# Patient Record
Sex: MALE | Race: AMERICAN INDIAN OR ALASKA NATIVE | NOT HISPANIC OR LATINO
[De-identification: names, ages, dates, MRNs, and addresses within clinical notes are randomized per-mention and may not be internally consistent; named-entity substitution may affect disease eponyms.]

---

## 2024-07-09 ENCOUNTER — DASHBOARD ENCOUNTERS (OUTPATIENT)
Age: 49
End: 2024-07-09

## 2024-07-09 ENCOUNTER — OFFICE VISIT (OUTPATIENT)
Dept: URBAN - METROPOLITAN AREA CLINIC 115 | Facility: CLINIC | Age: 49
End: 2024-07-09
Payer: COMMERCIAL

## 2024-07-09 VITALS
DIASTOLIC BLOOD PRESSURE: 90 MMHG | HEART RATE: 70 BPM | BODY MASS INDEX: 27.33 KG/M2 | SYSTOLIC BLOOD PRESSURE: 132 MMHG | TEMPERATURE: 97.5 F | HEIGHT: 71 IN | WEIGHT: 195.2 LBS

## 2024-07-09 DIAGNOSIS — Z87.19 HISTORY OF DIVERTICULITIS: ICD-10-CM

## 2024-07-09 DIAGNOSIS — F10.10 ETOH ABUSE: ICD-10-CM

## 2024-07-09 DIAGNOSIS — R10.30 LOWER ABDOMINAL PAIN: ICD-10-CM

## 2024-07-09 DIAGNOSIS — Z80.0 FAMILY HISTORY OF LIVER CANCER: ICD-10-CM

## 2024-07-09 PROBLEM — 15167005: Status: ACTIVE | Noted: 2024-07-09

## 2024-07-09 PROBLEM — 429236009: Status: ACTIVE | Noted: 2024-07-09

## 2024-07-09 PROBLEM — 118361000119100: Status: ACTIVE | Noted: 2024-07-09

## 2024-07-09 PROCEDURE — 99204 OFFICE O/P NEW MOD 45 MIN: CPT

## 2024-07-09 RX ORDER — HYDROCHLOROTHIAZIDE 12.5 MG/1
TABLET ORAL
Qty: 90 TABLET | Status: ACTIVE | COMMUNITY

## 2024-07-09 RX ORDER — CIPROFLOXACIN HYDROCHLORIDE 500 MG/1
TABLET, FILM COATED ORAL
Qty: 14 TABLET | Status: DISCONTINUED | COMMUNITY

## 2024-07-09 RX ORDER — PREDNISONE 50 MG/1
TABLET ORAL
Qty: 5 TABLET | Status: ACTIVE | COMMUNITY

## 2024-07-09 RX ORDER — AMLODIPINE BESYLATE 2.5 MG/1
TAKE 1 TABLET (2.5 MG) BY ORAL ROUTE ONCE DAILY TABLET ORAL 1
Qty: 0 | Refills: 0 | Status: DISCONTINUED | COMMUNITY
Start: 1900-01-01

## 2024-07-09 RX ORDER — METRONIDAZOLE 500 MG/1
TABLET ORAL
Qty: 14 TABLET | Status: DISCONTINUED | COMMUNITY

## 2024-07-09 RX ORDER — METOPROLOL SUCCINATE 50 MG/1
TABLET, EXTENDED RELEASE ORAL
Qty: 90 TABLET | Status: ACTIVE | COMMUNITY

## 2024-07-09 RX ORDER — OXYCODONE HYDROCHLORIDE AND ACETAMINOPHEN 10; 325 MG/1; MG/1
TAKE 1 TABLET BY ORAL ROUTE EVERY 6 HOURS AS NEEDED TABLET ORAL
Qty: 0 | Refills: 0 | Status: ACTIVE | COMMUNITY
Start: 1900-01-01

## 2024-07-09 RX ORDER — AMLODIPINE BESYLATE 10 MG/1
TABLET ORAL
Qty: 90 TABLET | Status: ACTIVE | COMMUNITY

## 2024-07-09 NOTE — HPI-TODAY'S VISIT:
49 y/o M with PMH of HTN, DDD on percocet, arthritis presents to [facility]. States he has a history of IBS; starting 1.5yrs ago, he had low abdominal pain and rectal bleeding so he went to the ER and was diagnosed with diverticulitis; but also had years of moving abdominal pain, was told it was probably "trapped gas" prior to the diverticulitis. A couple mos ago he had a lot of abodminal pain and gas, worse with eating salads/hamburgers. Was put on ciprofloxacin and metro but only took the cipro (miscommunication regarding needing both); which had helped a bit. After catching COVID in 2020, he started having issues with eating fruits/veggies. Reports constipation only with travel; usually goes 2-3x a day, soft and formed. States 4-5 days ago he had watery BMs for one day. States that before having a BM, he coughs up sometimes. Denies n/v, diarrhea, constipation, blood in stool, dysphagia, odynophagia, heartburn, unintentional weight loss, change in appetite, early satiety. Denies NSAID use. Takes iron sometimes. Reports EtOH (6 pack of beer a day for 10 years). Reports tobacco/marijuana use.  States he has had a hx of transaminitis and low WBC with PCP. CT from 2022 showed diverticulosis only. Last colonoscopy: 2014 with Dr. Saunders showed ext/int hemorrhoids; repeat at age 44 Had EGD in 2017 which showed mild chronic gastritis

## 2024-07-16 ENCOUNTER — TELEPHONE ENCOUNTER (OUTPATIENT)
Dept: URBAN - METROPOLITAN AREA CLINIC 115 | Facility: CLINIC | Age: 49
End: 2024-07-16

## 2024-07-16 ENCOUNTER — LAB OUTSIDE AN ENCOUNTER (OUTPATIENT)
Dept: URBAN - METROPOLITAN AREA CLINIC 115 | Facility: CLINIC | Age: 49
End: 2024-07-16

## 2024-07-16 LAB
ALBUMIN/GLOBULIN RATIO: 1.3
ALBUMIN: 4.6
ALKALINE PHOSPHATASE: 107
ALT: 60
AST: 51
BILIRUBIN, TOTAL: 1
BUN/CREATININE RATIO: (no result)
CALCIUM: 9.9
CARBON DIOXIDE: 27
CHLORIDE: 99
CREATININE: 0.85
EGFR: 107
ENHANCED LIVER FIBROSIS (ELF) SCORE: 8.91
FIB 4 INDEX: 1.6
FIB 4 INTERPRETATION: (no result)
GLOBULIN: 3.5
GLUCOSE: 94
HEMATOCRIT: 50.1
HEMOGLOBIN: 16.6
MCH: 32.5
MCHC: 33.1
MCV: 98.2
MPV: 9.9
PLATELET COUNT: 202
PLATELET COUNT: 202
POTASSIUM: 3.9
PROTEIN, TOTAL: 8.1
RDW: 11.8
RED BLOOD CELL COUNT: 5.1
SODIUM: 136
UREA NITROGEN (BUN): 9
WHITE BLOOD CELL COUNT: 4.1

## 2024-07-19 ENCOUNTER — OFFICE VISIT (OUTPATIENT)
Dept: URBAN - METROPOLITAN AREA CLINIC 115 | Facility: CLINIC | Age: 49
End: 2024-07-19

## 2024-08-08 ENCOUNTER — OFFICE VISIT (OUTPATIENT)
Dept: URBAN - METROPOLITAN AREA CLINIC 114 | Facility: CLINIC | Age: 49
End: 2024-08-08
Payer: COMMERCIAL

## 2024-08-08 DIAGNOSIS — R93.2 ABNORMAL ULTRASOUND OF LIVER: ICD-10-CM

## 2024-08-08 PROCEDURE — 76705 ECHO EXAM OF ABDOMEN: CPT | Performed by: INTERNAL MEDICINE

## 2024-08-20 ENCOUNTER — OFFICE VISIT (OUTPATIENT)
Dept: URBAN - METROPOLITAN AREA SURGERY CENTER 13 | Facility: SURGERY CENTER | Age: 49
End: 2024-08-20
Payer: COMMERCIAL

## 2024-08-20 DIAGNOSIS — Z12.11 ENCOUNTER FOR SCREENING FOR MALIGNANT NEOPLASM OF COLON: ICD-10-CM

## 2024-08-20 DIAGNOSIS — Z12.11 COLON CANCER SCREENING: ICD-10-CM

## 2024-08-20 DIAGNOSIS — K64.8 OTHER HEMORRHOIDS: ICD-10-CM

## 2024-08-20 DIAGNOSIS — K57.30 DIVERTICULOSIS OF LARGE INTESTINE WITHOUT PERFORATION OR ABSCESS WITHOUT BLEEDING: ICD-10-CM

## 2024-08-20 PROCEDURE — 0528F RCMND FLW-UP 10 YRS DOCD: CPT | Performed by: INTERNAL MEDICINE

## 2024-08-20 PROCEDURE — 00812 ANES LWR INTST SCR COLSC: CPT | Performed by: ANESTHESIOLOGIST ASSISTANT

## 2024-08-20 PROCEDURE — 00812 ANES LWR INTST SCR COLSC: CPT | Performed by: ANESTHESIOLOGY

## 2024-08-20 PROCEDURE — 45378 DIAGNOSTIC COLONOSCOPY: CPT | Performed by: INTERNAL MEDICINE

## 2024-08-20 RX ORDER — OXYCODONE HYDROCHLORIDE AND ACETAMINOPHEN 10; 325 MG/1; MG/1
TAKE 1 TABLET BY ORAL ROUTE EVERY 6 HOURS AS NEEDED TABLET ORAL
Qty: 0 | Refills: 0 | Status: ACTIVE | COMMUNITY
Start: 1900-01-01

## 2024-08-20 RX ORDER — PREDNISONE 50 MG/1
TABLET ORAL
Qty: 5 TABLET | Status: ACTIVE | COMMUNITY

## 2024-08-20 RX ORDER — METOPROLOL SUCCINATE 50 MG/1
TABLET, EXTENDED RELEASE ORAL
Qty: 90 TABLET | Status: ACTIVE | COMMUNITY

## 2024-08-20 RX ORDER — AMLODIPINE BESYLATE 10 MG/1
TABLET ORAL
Qty: 90 TABLET | Status: ACTIVE | COMMUNITY

## 2024-08-20 RX ORDER — HYDROCHLOROTHIAZIDE 12.5 MG/1
TABLET ORAL
Qty: 90 TABLET | Status: ACTIVE | COMMUNITY

## 2025-02-10 ENCOUNTER — OFFICE VISIT (OUTPATIENT)
Dept: URBAN - METROPOLITAN AREA CLINIC 82 | Facility: CLINIC | Age: 50
End: 2025-02-10
Payer: COMMERCIAL

## 2025-02-10 VITALS
HEIGHT: 71 IN | WEIGHT: 187.4 LBS | DIASTOLIC BLOOD PRESSURE: 75 MMHG | HEART RATE: 86 BPM | BODY MASS INDEX: 26.23 KG/M2 | SYSTOLIC BLOOD PRESSURE: 110 MMHG

## 2025-02-10 DIAGNOSIS — Z87.19 HISTORY OF DIVERTICULITIS: ICD-10-CM

## 2025-02-10 DIAGNOSIS — R93.3 ABNORMAL CT SCAN, SIGMOID COLON: ICD-10-CM

## 2025-02-10 DIAGNOSIS — F10.10 ETOH ABUSE: ICD-10-CM

## 2025-02-10 DIAGNOSIS — R10.32 ABDOMINAL CRAMPING IN LEFT LOWER QUADRANT: ICD-10-CM

## 2025-02-10 DIAGNOSIS — R79.89 ABNORMAL LFTS (LIVER FUNCTION TESTS): ICD-10-CM

## 2025-02-10 DIAGNOSIS — K29.50 MILD CHRONIC GASTRITIS: ICD-10-CM

## 2025-02-10 DIAGNOSIS — K64.1 GRADE II INTERNAL HEMORRHOIDS: ICD-10-CM

## 2025-02-10 DIAGNOSIS — E66.3 OVERWEIGHT (BMI 25.0-29.9): ICD-10-CM

## 2025-02-10 PROBLEM — 166603001: Status: ACTIVE | Noted: 2025-02-10

## 2025-02-10 PROBLEM — 162863004: Status: ACTIVE | Noted: 2025-02-10

## 2025-02-10 PROBLEM — 721704005: Status: ACTIVE | Noted: 2025-02-10

## 2025-02-10 PROBLEM — 129679001: Status: ACTIVE | Noted: 2025-02-10

## 2025-02-10 PROBLEM — 8493009: Status: ACTIVE | Noted: 2025-02-10

## 2025-02-10 PROCEDURE — 99214 OFFICE O/P EST MOD 30 MIN: CPT | Performed by: INTERNAL MEDICINE

## 2025-02-10 RX ORDER — HYDROCHLOROTHIAZIDE 12.5 MG/1
TABLET ORAL
Qty: 90 TABLET | Status: ACTIVE | COMMUNITY

## 2025-02-10 RX ORDER — OXYCODONE HYDROCHLORIDE AND ACETAMINOPHEN 10; 325 MG/1; MG/1
TAKE 1 TABLET BY ORAL ROUTE EVERY 6 HOURS AS NEEDED TABLET ORAL
Qty: 0 | Refills: 0 | Status: ACTIVE | COMMUNITY
Start: 1900-01-01

## 2025-02-10 RX ORDER — AMLODIPINE BESYLATE 10 MG/1
TABLET ORAL
Qty: 90 TABLET | Status: ACTIVE | COMMUNITY

## 2025-02-10 RX ORDER — METOPROLOL SUCCINATE 50 MG/1
TABLET, EXTENDED RELEASE ORAL
Qty: 90 TABLET | Status: ACTIVE | COMMUNITY

## 2025-02-10 RX ORDER — HYDROCORTISONE ACETATE 25 MG/1
1 SUPPOSITORY SUPPOSITORY RECTAL ONCE A DAY
Status: ACTIVE | COMMUNITY

## 2025-02-10 RX ORDER — PREDNISONE 50 MG/1
TABLET ORAL
Qty: 5 TABLET | Status: ON HOLD | COMMUNITY

## 2025-02-10 NOTE — HPI-TODAY'S VISIT:
48 y/o Man with overweight,liver steatosis,mild chronic gastritis,diverticulosis,alcohol abuse disorder and hemorroids that came for silva cordova recent hospital admision last month. After comming from a [place] he develops LLQ pain,fever,chills,cough and he went to ER labs with bicytopenia,CT shows no complicated colitis on the left colon and he was admited for IV abx and discharge next day on oral Cipro+metro,he complete 10 day course of abx and refer he feels much better,stop drinking after that event,develops some swollenn lymph node on the neck but they went away today he denies diarrhea,fever,weight loss.Feel much better.PCp prescribe anusol-HC and that helps with hemorrhoids. We review what diet to avoid.Will refer to Nutritionist

## 2025-02-25 LAB
CALPROTECTIN, STOOL - QDX: (no result)
H. PYLORI (EIA) - QDX: NEGATIVE

## 2025-03-31 ENCOUNTER — OFFICE VISIT (OUTPATIENT)
Dept: URBAN - METROPOLITAN AREA CLINIC 82 | Facility: CLINIC | Age: 50
End: 2025-03-31

## 2025-05-09 ENCOUNTER — OFFICE VISIT (OUTPATIENT)
Dept: URBAN - METROPOLITAN AREA CLINIC 82 | Facility: CLINIC | Age: 50
End: 2025-05-09
Payer: COMMERCIAL

## 2025-05-09 DIAGNOSIS — R79.89 ABNORMAL LFTS (LIVER FUNCTION TESTS): ICD-10-CM

## 2025-05-09 DIAGNOSIS — K64.1 GRADE II INTERNAL HEMORRHOIDS: ICD-10-CM

## 2025-05-09 DIAGNOSIS — E66.3 OVERWEIGHT (BMI 25.0-29.9): ICD-10-CM

## 2025-05-09 PROCEDURE — 99213 OFFICE O/P EST LOW 20 MIN: CPT | Performed by: INTERNAL MEDICINE

## 2025-05-09 PROCEDURE — 46615 ANOSCOPY: CPT | Performed by: INTERNAL MEDICINE

## 2025-05-09 PROCEDURE — 46611 ANOSCOPY: CPT | Performed by: INTERNAL MEDICINE

## 2025-05-09 RX ORDER — HYDROCHLOROTHIAZIDE 12.5 MG/1
TABLET ORAL
Qty: 90 TABLET | Status: ACTIVE | COMMUNITY

## 2025-05-09 RX ORDER — OXYCODONE HYDROCHLORIDE AND ACETAMINOPHEN 10; 325 MG/1; MG/1
TAKE 1 TABLET BY ORAL ROUTE EVERY 6 HOURS AS NEEDED TABLET ORAL
Qty: 0 | Refills: 0 | Status: ACTIVE | COMMUNITY
Start: 1900-01-01

## 2025-05-09 RX ORDER — AMLODIPINE BESYLATE 10 MG/1
TABLET ORAL
Qty: 90 TABLET | Status: ACTIVE | COMMUNITY

## 2025-05-09 RX ORDER — METOPROLOL SUCCINATE 50 MG/1
TABLET, EXTENDED RELEASE ORAL
Qty: 90 TABLET | Status: ACTIVE | COMMUNITY

## 2025-05-09 RX ORDER — PREDNISONE 50 MG/1
TABLET ORAL
Qty: 5 TABLET | Status: ON HOLD | COMMUNITY

## 2025-05-09 RX ORDER — HYDROCORTISONE ACETATE 25 MG/1
1 SUPPOSITORY SUPPOSITORY RECTAL ONCE A DAY
Qty: 30 | Refills: 1

## 2025-05-09 RX ORDER — HYDROCORTISONE ACETATE 25 MG/1
1 SUPPOSITORY SUPPOSITORY RECTAL ONCE A DAY
Status: ACTIVE | COMMUNITY

## 2025-05-09 NOTE — HPI-TODAY'S VISIT:
48 y/o Man with internal hemorrhoids refer intermittent bleeding.Will go ahead and do anoscopy with banding today.Colonoscopy with no signs oif IBD and fecal calprotectin WNL.No smoke

## 2025-05-12 ENCOUNTER — TELEPHONE ENCOUNTER (OUTPATIENT)
Dept: URBAN - METROPOLITAN AREA CLINIC 82 | Facility: CLINIC | Age: 50
End: 2025-05-12

## 2025-05-12 RX ORDER — HYDROCORTISONE 2.5% 25 MG/G
1 APPLICATION CREAM TOPICAL TWICE A DAY
Qty: 60 GRAMS | Refills: 0 | OUTPATIENT
Start: 2025-05-13 | End: 2025-07-12

## 2025-06-27 ENCOUNTER — OFFICE VISIT (OUTPATIENT)
Dept: URBAN - METROPOLITAN AREA CLINIC 82 | Facility: CLINIC | Age: 50
End: 2025-06-27

## 2025-07-23 ENCOUNTER — TELEPHONE ENCOUNTER (OUTPATIENT)
Dept: URBAN - METROPOLITAN AREA CLINIC 115 | Facility: CLINIC | Age: 50
End: 2025-07-23

## 2025-07-23 ENCOUNTER — OFFICE VISIT (OUTPATIENT)
Dept: URBAN - METROPOLITAN AREA CLINIC 82 | Facility: CLINIC | Age: 50
End: 2025-07-23
Payer: COMMERCIAL

## 2025-07-23 DIAGNOSIS — K64.8 INTERNAL HEMORRHOIDS: ICD-10-CM

## 2025-07-23 DIAGNOSIS — L29.0 RECTAL ITCHING: ICD-10-CM

## 2025-07-23 DIAGNOSIS — Z87.19 HISTORY OF IBS: ICD-10-CM

## 2025-07-23 PROCEDURE — 99213 OFFICE O/P EST LOW 20 MIN: CPT | Performed by: INTERNAL MEDICINE

## 2025-07-23 RX ORDER — OXYCODONE HYDROCHLORIDE AND ACETAMINOPHEN 10; 325 MG/1; MG/1
TAKE 1 TABLET BY ORAL ROUTE EVERY 6 HOURS AS NEEDED TABLET ORAL
Qty: 0 | Refills: 0 | Status: ACTIVE | COMMUNITY
Start: 1900-01-01

## 2025-07-23 RX ORDER — HYDROCORTISONE ACETATE AND PRAMOXINE HYDROCHLORIDE 25; 10 MG/G; MG/G
1 APPLICATION CREAM TOPICAL THREE TIMES A DAY
Qty: 1 | Refills: 0 | OUTPATIENT
Start: 2025-07-23 | End: 2025-08-06

## 2025-07-23 RX ORDER — HYDROCORTISONE ACETATE 25 MG/1
1 SUPPOSITORY SUPPOSITORY RECTAL ONCE A DAY
Qty: 30 | Refills: 1 | Status: ACTIVE | COMMUNITY

## 2025-07-23 RX ORDER — METOPROLOL SUCCINATE 50 MG/1
TABLET, EXTENDED RELEASE ORAL
Qty: 90 TABLET | Status: ACTIVE | COMMUNITY

## 2025-07-23 RX ORDER — PREDNISONE 50 MG/1
TABLET ORAL
Qty: 5 TABLET | Status: ACTIVE | COMMUNITY

## 2025-07-23 RX ORDER — AMLODIPINE BESYLATE 10 MG/1
TABLET ORAL
Qty: 90 TABLET | Status: ACTIVE | COMMUNITY

## 2025-07-23 RX ORDER — HYDROCHLOROTHIAZIDE 12.5 MG/1
TABLET ORAL
Qty: 90 TABLET | Status: ACTIVE | COMMUNITY

## 2025-07-23 RX ORDER — HYOSCYAMINE SULFATE 0.12 MG/1
1 TABLET UNDER THE TONGUE AND ALLOW TO DISSOLVE AS NEEDED TABLET, ORALLY DISINTEGRATING ORAL
Qty: 90 | Refills: 1 | OUTPATIENT
Start: 2025-07-23

## 2025-07-23 NOTE — HPI-TODAY'S VISIT:
Prev seen by Dr. Sam, reports severe pain after the hemorrhoid banding for a week, went to urgent care. hx IBS. has had colonoscopy. bentyl without much relief of cramping/ibs.